# Patient Record
Sex: FEMALE | Race: WHITE | Employment: STUDENT | URBAN - METROPOLITAN AREA
[De-identification: names, ages, dates, MRNs, and addresses within clinical notes are randomized per-mention and may not be internally consistent; named-entity substitution may affect disease eponyms.]

---

## 2017-01-26 ENCOUNTER — MYC MEDICAL ADVICE (OUTPATIENT)
Dept: ORTHOPEDICS | Facility: CLINIC | Age: 22
End: 2017-01-26

## 2017-02-20 ENCOUNTER — OFFICE VISIT (OUTPATIENT)
Dept: ORTHOPEDICS | Facility: CLINIC | Age: 22
End: 2017-02-20
Payer: COMMERCIAL

## 2017-02-20 VITALS — SYSTOLIC BLOOD PRESSURE: 122 MMHG | DIASTOLIC BLOOD PRESSURE: 70 MMHG | HEART RATE: 67 BPM | OXYGEN SATURATION: 97 %

## 2017-02-20 DIAGNOSIS — M25.312 INSTABILITY OF LEFT SHOULDER JOINT: Primary | ICD-10-CM

## 2017-02-20 PROCEDURE — 99213 OFFICE O/P EST LOW 20 MIN: CPT | Performed by: FAMILY MEDICINE

## 2017-02-20 ASSESSMENT — PAIN SCALES - GENERAL: PAINLEVEL: SEVERE PAIN (7)

## 2017-02-20 NOTE — NURSING NOTE
"Jenifer Berry's goals for this visit include: Follow up with left shoulder pain.   She requests these members of her care team be copied on today's visit information: yes    PCP: Pooja Sheridan    Referring Provider:  Luz Maria Arboleda MD  [place] [id number]  [place] [address]    Chief Complaint   Patient presents with     RECHECK     Shoulder left     Pain is the same.        Initial /70 (BP Location: Right arm, Patient Position: Chair, Cuff Size: Adult Regular)  Pulse 67  SpO2 97% Estimated body mass index is 25.11 kg/(m^2) as calculated from the following:    Height as of 11/23/15: 1.6 m (5' 3\").    Weight as of 11/23/15: 64.3 kg (141 lb 12.1 oz).  Medication Reconciliation: complete    "

## 2017-02-20 NOTE — MR AVS SNAPSHOT
After Visit Summary   2017    Jenifer Berry    MRN: [id number]           Patient Information     Date Of Birth          1995        Visit Information        Provider Department      2017 10:40 AM Vito Calderon, DO [facility]        Today's Diagnoses     Instability of left shoulder joint    -  1      Care Instructions    Thanks for coming today.  Ortho[facility] Medicine Clinic  [address] [place] [address]    To schedule future appointments in Ortho Clinic, you may call [phone].    To schedule ordered imaging by your provider:   Call Central Imaging [phone]    To schedule an injection ordered by your provider:  Call Central Imaging Injection scheduling line: [phone]  Blizuu available online at:  [email or web address]    Please call if any further questions or concerns ([phone]).  Clinic hours 8 am to 5 pm.    Return to clinic (call) if symptoms worsen or fail to improve.          Follow-ups after your visit        Who to contact     If you have questions or need follow up information about today's clinic visit or your schedule please contact [facility] directly at [phone].  Normal or non-critical lab and imaging results will be communicated to you by wriplhart, letter or phone within 4 business days after the clinic has received the results. If you do not hear from us within 7 days, please contact the clinic through wriplhart or phone. If you have a critical or abnormal lab result, we will notify you by phone as soon as possible.  Submit refill requests through Blizuu or call your pharmacy and they will forward the refill request to us. Please allow 3 business days for your refill to be completed.          Additional Information About Your Visit        MyChart Information     Blizuu gives you secure access to your electronic health record. If you see a primary care provider, you can also send  messages to your care team and make appointments. If you have questions, please call your primary care clinic.  If you do not have a primary care provider, please call [phone] and they will assist you.      LawPath is an electronic gateway that provides easy, online access to your medical records. With LawPath, you can request a clinic appointment, read your test results, renew a prescription or communicate with your care team.     To access your existing account, please contact your [facility] or call [phone] for assistance.        Care EveryWhere ID     This is your Care EveryWhere ID. This could be used by other organizations to access your [facility] medical records  [id number]        Your Vitals Were     Pulse Pulse Oximetry                67 97%           Blood Pressure from Last 3 Encounters:   02/20/17 122/70   10/10/16 121/80   11/23/15 115/71    Weight from Last 3 Encounters:   11/23/15 64.3 kg (141 lb 12.1 oz)   04/09/15 63.9 kg (140 lb 14 oz) (71 %)*   12/17/14 64.4 kg (142 lb) (74 %)*     * Growth percentiles are based on [facility] 2-20 Years data.              Today, you had the following     No orders found for display       Primary Care Provider Office Phone # Fax #    Pooja Sheridan [phone]       [facility] [id number] [place] [address]        Thank you!     Thank you for choosing [facility]  for your care. Our goal is always to provide you with excellent care. Hearing back from our patients is one way we can continue to improve our services. Please take a few minutes to complete the written survey that you may receive in the mail after your visit with us. Thank you!             Your Updated Medication List - Protect others around you: Learn how to safely use, store and throw away your medicines at www.disposemymeds.org.          This list is accurate as of: 2/20/17 11:59 PM.  Always use your  most recent med list.                   Brand Name Dispense Instructions for use    APRI 0.15-30 MG-MCG per tablet   Generic drug:  desogestrel-ethinyl estradiol      Take 1 tablet by mouth       * NAPROXEN PO      Take by mouth as needed       * naproxen 500 MG tablet    NAPROSYN     Take 500 mg by mouth       * Notice:  This list has 2 medication(s) that are the same as other medications prescribed for you. Read the directions carefully, and ask your doctor or other care provider to review them with you.

## 2017-02-20 NOTE — PROGRESS NOTES
"HISTORY OF PRESENT ILLNESS  Ms. Berry is a pleasant 21 year old year old female who presents to clinic today for follow up of her left shoulder pain. 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 Arboleda for left shoulder instability.    Jenifer was at the gym a couple of weeks ago and felt her left shoulder sublux while doing core work.  She's been having left handed tingling since this point.  \"My arm feels uncomfortable.\"  Worse with certain movements.  Stabbing.  Worst is her lateral shoulder, feels some numbness over the deltoid that is improving.  Additional history: as documented      REVIEW OF SYSTEMS (2/20/2017)  10 point ROS of systems including Constitutional, Eyes, Respiratory, Cardiovascular, Gastroenterology, Genitourinary, Integumentary, Musculoskeletal, Psychiatric were all negative except for pertinent positives noted in my HPI.     PHYSICAL EXAM  Vitals:    02/20/17 1039   BP: 122/70   BP Location: Right arm   Patient Position: Chair   Cuff Size: Adult Regular   Pulse: 67   SpO2: 97%     General  - normal appearance, in no obvious distress  CV  - normal radial pulse  Pulm  - normal respiratory pattern, non-labored  Musculoskeletal - left shoulder  - inspection: normal bone and joint alignment, no obvious deformity, no scapular winging, no AC step-off  - palpation: mild general tenderness anteriorly and superiorly  - ROM: normal flexion, IR, ER, abduction, painful at end range  - strength: 5/5  strength, 5/5 in all shoulder planes  - special tests:  (-) Speed's  (-) Neer  (-) Hawkin's  (-) Yohannes's  (+) Forbes's  (+) load & shift, supine  (+) apprehension  (-) subscap lift-off  Neuro  - no sensory or motor deficit, grossly normal coordination, normal muscle tone  Skin  - no ecchymosis, erythema, warmth, or induration, no obvious rash  Psych  - interactive, appropriate, normal mood and affect              ASSESSMENT & PLAN  Ms. Berry is a 21 year old year old female who is in " the office today with left shoulder pain, numbness, and instability.    Jenifer's symptoms are likely secondary to nerve dysfunction after a subluxation episode.  Ultimately I'm happy that she's improving.  I reassured her that her symptoms will likely improve.  She understands Hemal-Danlos well and knows that her underlying disorder is likely causing these episodes, although she's understandably frustrated to have a recurrence after stabilization surgery.    Jenifer should slowly restart her strengthening routine and advance as tolerated.    It was a pleasure taking care of Jenifer.        Vito Calderon, , CAQSM

## 2017-02-20 NOTE — PATIENT INSTRUCTIONS
Thanks for coming today.  Ortho[facility] Medicine Clinic  [address] [place] [address]    To schedule future appointments in Ortho Clinic, you may call [phone].    To schedule ordered imaging by your provider:   Call Central Imaging [phone]    To schedule an injection ordered by your provider:  Call Central Imaging Injection scheduling line: [phone]  [employer] Spinehart available online at:  [email or web address]    Please call if any further questions or concerns ([phone]).  Clinic hours 8 am to 5 pm.    Return to clinic (call) if symptoms worsen or fail to improve.

## 2017-03-15 ENCOUNTER — MYC MEDICAL ADVICE (OUTPATIENT)
Dept: ORTHOPEDICS | Facility: CLINIC | Age: 22
End: 2017-03-15

## 2017-03-30 ENCOUNTER — RADIANT APPOINTMENT (OUTPATIENT)
Dept: GENERAL RADIOLOGY | Facility: CLINIC | Age: 22
End: 2017-03-30
Attending: FAMILY MEDICINE
Payer: COMMERCIAL

## 2017-03-30 ENCOUNTER — OFFICE VISIT (OUTPATIENT)
Dept: ORTHOPEDICS | Facility: CLINIC | Age: 22
End: 2017-03-30
Payer: COMMERCIAL

## 2017-03-30 VITALS — HEART RATE: 82 BPM | SYSTOLIC BLOOD PRESSURE: 118 MMHG | OXYGEN SATURATION: 98 % | DIASTOLIC BLOOD PRESSURE: 70 MMHG

## 2017-03-30 DIAGNOSIS — M54.12 CERVICAL RADICULOPATHY: ICD-10-CM

## 2017-03-30 DIAGNOSIS — M54.2 ACUTE NECK PAIN: ICD-10-CM

## 2017-03-30 DIAGNOSIS — M54.2 ACUTE NECK PAIN: Primary | ICD-10-CM

## 2017-03-30 PROCEDURE — 72040 X-RAY EXAM NECK SPINE 2-3 VW: CPT | Performed by: RADIOLOGY

## 2017-03-30 PROCEDURE — 99213 OFFICE O/P EST LOW 20 MIN: CPT | Performed by: FAMILY MEDICINE

## 2017-03-30 RX ORDER — PREDNISONE 20 MG/1
40 TABLET ORAL DAILY
Qty: 10 TABLET | Refills: 0 | Status: SHIPPED | OUTPATIENT
Start: 2017-03-30 | End: 2017-04-04

## 2017-03-30 ASSESSMENT — PAIN SCALES - GENERAL: PAINLEVEL: SEVERE PAIN (7)

## 2017-03-30 NOTE — NURSING NOTE
"Jenifer Berry's goals for this visit include: Follow up with neck pain.   She requests these members of her care team be copied on today's visit information: yes    PCP: Pooja Sheridan    Referring Provider:  Luz Maria Arboleda MD  [place] [id number] [place] [id number]  [place] [address]    Chief Complaint   Patient presents with     RECHECK     Neck Pain     Jenifer said the pain is getting worse.        Initial /70 (BP Location: Left arm, Patient Position: Chair, Cuff Size: Adult Regular)  Pulse 82  SpO2 98% Estimated body mass index is 25.11 kg/(m^2) as calculated from the following:    Height as of 11/23/15: 1.6 m (5' 3\").    Weight as of 11/23/15: 64.3 kg (141 lb 12.1 oz).  Medication Reconciliation: complete    "

## 2017-03-30 NOTE — PROGRESS NOTES
HISTORY OF PRESENT ILLNESS  Ms. Berry is a pleasant 21 year old year old female who presents to clinic today for follow up of her shoulder pain.  Jenifer called in recently reporting new symptoms of left arm numbness, tingling, and weakness that travelled down to her hand.  Jenifer explains that her left 4th and 5th fingers have continued to go numb and tingly over the course of the past few weeks.  Worse with neck movement.  Will get weak at times.  Additional history: as documented      REVIEW OF SYSTEMS (3/30/2017)  10 point ROS of systems including Constitutional, Eyes, Respiratory, Cardiovascular, Gastroenterology, Genitourinary, Integumentary, Musculoskeletal, Psychiatric were all negative except for pertinent positives noted in my HPI.     PHYSICAL EXAM  Vitals:    03/30/17 1509   BP: 118/70   BP Location: Left arm   Patient Position: Chair   Cuff Size: Adult Regular   Pulse: 82   SpO2: 98%     General  - normal appearance, in no obvious distress  CV  - normal peripheral perfusion  Pulm  - normal respiratory pattern, non-labored  Musculoskeletal - cervical spine  - inspection: normal bone and joint alignment, no obvious kyphosis  - palpation: no paravertebral or bony tenderness  - ROM: pain with right rotation and sidebending  - strength: upper extremities 5/5 in all planes  Neuro  - C5-7 DTRs 2+ bilaterally, no sensory or motor deficit, grossly normal coordination, normal muscle tone  Skin  - no ecchymosis, erythema, warmth, or induration, no obvious rash  Psych  - interactive, appropriate, normal mood and affect          ASSESSMENT & PLAN  Ms. Berry is a 21 year old year old female who is in the office today with left arm numbness and tingling.    I ordered & reviewed an xray of her cervical spine which shows disc space narrowing at the inferior cervical levels.  This fits with her symptoms of C8 radiculopathy.    Jenifer and I had a good discussion regarding non-operative treatment for degenerative disc  disease.  This included strengthening of the paraspinal and core muscles, weight control, and oral analgesics when needed.  We also discussed the role of epidural corticosteroid injections.    I prescribed her a short course of prednisone.  I also demonstrated some simple cervical muscle exercises to perform daily.    If Jenifer doesn't improve or worsens in the next 4-6 weeks we could perform advanced imaging.    It was a pleasure taking care of Jenifer.        Vito Calderon DO, CAQSM

## 2017-03-30 NOTE — PATIENT INSTRUCTIONS
Thanks for coming today.  Ortho/Sports Medicine Clinic  [address] [place] [address]    To schedule future appointments in Ortho Clinic, you may call [phone].    To schedule ordered imaging by your provider:   Call Central Imaging [phone]    To schedule an injection ordered by your provider:  Call Central Imaging Injection scheduling line: [phone]  YAZUOhart available online at:  [email or web address]    Please call if any further questions or concerns ([phone]).  Clinic hours 8 am to 5 pm.    Return to clinic (call) if symptoms worsen or fail to improve.

## 2017-06-13 ENCOUNTER — MYC MEDICAL ADVICE (OUTPATIENT)
Dept: ORTHOPEDICS | Facility: CLINIC | Age: 22
End: 2017-06-13

## 2017-06-13 DIAGNOSIS — M54.2 ACUTE NECK PAIN: ICD-10-CM

## 2017-06-13 DIAGNOSIS — M54.12 CERVICAL RADICULOPATHY: Primary | ICD-10-CM

## 2017-07-07 ENCOUNTER — RADIANT APPOINTMENT (OUTPATIENT)
Dept: MRI IMAGING | Facility: CLINIC | Age: 22
End: 2017-07-07
Attending: FAMILY MEDICINE
Payer: COMMERCIAL

## 2017-07-07 DIAGNOSIS — M54.2 ACUTE NECK PAIN: ICD-10-CM

## 2017-07-07 DIAGNOSIS — M54.12 CERVICAL RADICULOPATHY: ICD-10-CM

## 2017-07-07 PROCEDURE — 73221 MRI JOINT UPR EXTREM W/O DYE: CPT | Mod: LT | Performed by: RADIOLOGY

## 2017-07-07 PROCEDURE — 72141 MRI NECK SPINE W/O DYE: CPT | Performed by: RADIOLOGY

## 2017-07-24 ENCOUNTER — MYC MEDICAL ADVICE (OUTPATIENT)
Dept: ORTHOPEDICS | Facility: CLINIC | Age: 22
End: 2017-07-24

## 2017-07-27 ENCOUNTER — MYC MEDICAL ADVICE (OUTPATIENT)
Dept: ORTHOPEDICS | Facility: CLINIC | Age: 22
End: 2017-07-27

## 2017-08-16 ENCOUNTER — TELEPHONE (OUTPATIENT)
Dept: ORTHOPEDICS | Facility: CLINIC | Age: 22
End: 2017-08-16

## 2017-08-16 ENCOUNTER — THERAPY VISIT (OUTPATIENT)
Dept: PHYSICAL THERAPY | Facility: CLINIC | Age: 22
End: 2017-08-16
Payer: COMMERCIAL

## 2017-08-16 DIAGNOSIS — M25.512 LEFT SHOULDER PAIN, UNSPECIFIED CHRONICITY: Primary | ICD-10-CM

## 2017-08-16 DIAGNOSIS — M54.12 CERVICAL RADICULOPATHY: Primary | ICD-10-CM

## 2017-08-16 DIAGNOSIS — Z47.89 AFTERCARE FOLLOWING SURGERY OF THE MUSCULOSKELETAL SYSTEM: ICD-10-CM

## 2017-08-16 DIAGNOSIS — M25.312 INSTABILITY OF LEFT SHOULDER JOINT: Primary | ICD-10-CM

## 2017-08-16 DIAGNOSIS — M54.2 CERVICALGIA: ICD-10-CM

## 2017-08-16 PROCEDURE — 97112 NEUROMUSCULAR REEDUCATION: CPT | Mod: GP | Performed by: PHYSICAL THERAPIST

## 2017-08-16 PROCEDURE — 97161 PT EVAL LOW COMPLEX 20 MIN: CPT | Mod: GP | Performed by: PHYSICAL THERAPIST

## 2017-08-16 PROCEDURE — 97110 THERAPEUTIC EXERCISES: CPT | Mod: GP | Performed by: PHYSICAL THERAPIST

## 2017-08-16 NOTE — LETTER
[facility]  [address]  [place] [address]  [phone]    2017    Re: Jenifer Berry   :   1995  MRN:  [id number]   REFERRING PHYSICIAN:   [facility]    Date of Initial Evaluation:  17  Visits:  Rxs Used: 1  Reason for Referral:     Aftercare following surgery of the musculoskeletal system  Left shoulder pain, unspecified chronicity    [facility] Initial Evaluation    Subjective:  Patient is a 21 year old female presenting with rehab left shoulder hpi.   Jenifer Berry is a 21 year old female with a left shoulder condition.  Condition occurred with:  Repetition/overuse.  Condition occurred: during recreation/sport.  This is a chronic condition  17 patient received MD orders for left shoulder pain and instability that started about 6 months ago when she was working out.    Patient reports pain:  Anterior, posterior and in the joint.  Radiates to: some numbness in 4th, 5th digits when elbow flexed for long periods.  Pain is described as sharp and aching and is intermittent   Associated symptoms:  Dislocating/subluxing and loss of strength. Pain is worse during the day.  Symptoms are exacerbated by using arm overhead, certain positions, lifting and using arm at shoulder level and relieved by rest.  Since onset symptoms are unchanged.  Special tests:  X-ray and MRI.  Previous treatment includes physical therapy and surgery.  There was significant improvement following previous treatment.  General health as reported by patient is good.  Past medical history: Hemal-Danlos.  Medical allergies: no.  Other surgeries include:  Orthopedic surgery (L shoulder x2).  Current medications:  Pain medication.  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  Primary job tasks include:  Prolonged standing, other and lifting (computer work).  Barriers include:  None as  reported by the patient.  Red flags:  None as reported by the patient.    Objective:  SHOULDER:   AROM L AROM R MMT L MMT R   Flex 175 180 5-/5 5/5   Abd 175 180 5-/5, mild pain 5/5   Full Can   5-/5 5/5   IR   5/5 5/5   ER 85 90 5-/5 5/5   Ext/IR T5 T5       Scapulothoraic Rhythm: left scapular dyskinesis noted with repeated overhead flexion (prominent medial border/winging)    Palpation: tender over left proximal biceps tendon    Special tests:   L R   Impingement     Neer's - -   Hawkin's-Helder - -   Coracoid Impingement - -   Internal impingement + -   Labral     Anterior Slide - -   Stark's - -   Crank - -   Instability     Apprehension (anterior) +/- -   Relocation (anterior) +/- -   Anterior Load & Shift - -   Posterior Load & Shift - -   Posterior instability (with 90 degrees flex) - -   Multi-Directional Instability      Sulcus + -   Biceps      Speed's + -   Rotator Cuff Tear     Drop Arm - -   Belly Press - -   Lift off  - -     GH Mobility  L  R   Posterior glide hyper hyper   Inferior glide hyper hyper   Anterior glide hypo hyper     Assessment/Plan:    Patient is a 21 year old female with left side shoulder complaints.    Patient has the following significant findings with corresponding treatment plan.                Diagnosis 1:  Left shoulder pain    Pain -  hot/cold therapy, splint/taping/bracing/orthotics, self management, education and home program  Decreased strength - therapeutic exercise, therapeutic activities and home program  Decreased proprioception - neuro re-education, therapeutic activities and home program  Decreased function - therapeutic activities and home program  Instability -  Therapeutic Activity  Therapeutic Exercise  Neuromuscular Re-education  Splinting/Taping/Bracing/Orthotic    Therapy Evaluation Codes:   1) History comprised of:   Personal factors that impact the plan of care:      Past/current experiences.    Comorbidity factors that impact the plan of care are:       Hemal-Danlos syndrome.     Medications impacting care: Pain.  2) Examination of Body Systems comprised of:   Body structures and functions that impact the plan of care:      Shoulder.   Activity limitations that impact the plan of care are:      Cooking, Dressing, Lifting and Working.  3) Clinical presentation characteristics are:   Stable/Uncomplicated.  4) Decision-Making    Low complexity using standardized patient assessment instrument and/or measureable assessment of functional outcome.    Cumulative Therapy Evaluation is: Low complexity.  Previous and current functional limitations:  (See Goal Flow Sheet for this information)    Short term and Long term goals: (See Goal Flow Sheet for this information)   Communication ability:  Patient appears to be able to clearly communicate and understand verbal and written communication and follow directions correctly.  Treatment Explanation - The following has been discussed with the patient:   RX ordered/plan of care  Anticipated outcomes  Possible risks and side effects  This patient would benefit from PT intervention to resume normal activities.   Rehab potential is good.    Frequency:  1 X week, once daily  Duration:  for 4 weeks tapering to 2 X a month over 8 weeks  Discharge Plan:  Achieve all LTG.  Independent in home treatment program.  Reach maximal therapeutic benefit.    Thank you for your referral.    INQUIRIES  Therapist: Brenton Landis DPT, SCS  [facility]  [address] [place] [address]  Phone: [phone]  Fax: [phone]

## 2017-08-16 NOTE — PROGRESS NOTES
[facility] Initial Evaluation    Subjective:    Patient is a 21 year old female presenting with rehab left shoulder hpi.   Jenifer Berry is a 21 year old female with a left shoulder condition.  Condition occurred with:  Repetition/overuse.  Condition occurred: during recreation/sport.  This is a chronic condition  8/16/17 patient received MD orders for left shoulder pain and instability that started about 6 months ago when she was working out.    Patient reports pain:  Anterior, posterior and in the joint.  Radiates to: some numbness in 4th, 5th digits when elbow flexed for long periods.  Pain is described as sharp and aching and is intermittent   Associated symptoms:  Dislocating/subluxing and loss of strength. Pain is worse during the day.  Symptoms are exacerbated by using arm overhead, certain positions, lifting and using arm at shoulder level and relieved by rest.  Since onset symptoms are unchanged.  Special tests:  X-ray and MRI.  Previous treatment includes physical therapy and surgery.  There was significant improvement following previous treatment.  General health as reported by patient is good.  Past medical history: Hemal-Danlos.  Medical allergies: no.  Other surgeries include:  Orthopedic surgery (L shoulder x2).  Current medications:  Pain medication.  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  Primary job tasks include:  Prolonged standing, other and lifting (computer work).    Barriers include:  None as reported by the patient.    Red flags:  None as reported by the patient.                        Objective:  SHOULDER:   AROM L AROM R MMT L MMT R   Flex 175 180 5-/5 5/5   Abd 175 180 5-/5, mild pain 5/5   Full Can   5-/5 5/5   IR   5/5 5/5   ER 85 90 5-/5 5/5   Ext/IR T5 T5       Scapulothoraic Rhythm: left scapular dyskinesis noted with repeated overhead flexion (prominent medial border/winging)    Palpation:  tender over left proximal biceps tendon    Special tests:   L R   Impingement     Neer's - -   Hawkin's-Helder - -   Coracoid Impingement - -   Internal impingement + -   Labral     Anterior Slide - -   Metcalfe's - -   Crank - -   Instability     Apprehension (anterior) +/- -   Relocation (anterior) +/- -   Anterior Load & Shift - -   Posterior Load & Shift - -   Posterior instability (with 90 degrees flex) - -   Multi-Directional Instability      Sulcus + -   Biceps      Speed's + -   Rotator Cuff Tear     Drop Arm - -   Belly Press - -   Lift off  - -     GH Mobility  L  R   Posterior glide hyper hyper   Inferior glide hyper hyper   Anterior glide hypo hyper           System    Physical Exam    General     ROS    Assessment/Plan:      Patient is a 21 year old female with left side shoulder complaints.    Patient has the following significant findings with corresponding treatment plan.                Diagnosis 1:  Left shoulder pain    Pain -  hot/cold therapy, splint/taping/bracing/orthotics, self management, education and home program  Decreased strength - therapeutic exercise, therapeutic activities and home program  Decreased proprioception - neuro re-education, therapeutic activities and home program  Decreased function - therapeutic activities and home program  Instability -  Therapeutic Activity  Therapeutic Exercise  Neuromuscular Re-education  Splinting/Taping/Bracing/Orthotic    Therapy Evaluation Codes:   1) History comprised of:   Personal factors that impact the plan of care:      Past/current experiences.    Comorbidity factors that impact the plan of care are:      Hemal-Danlos syndrome.     Medications impacting care: Pain.  2) Examination of Body Systems comprised of:   Body structures and functions that impact the plan of care:      Shoulder.   Activity limitations that impact the plan of care are:      Cooking, Dressing, Lifting and Working.  3) Clinical presentation characteristics  are:   Stable/Uncomplicated.  4) Decision-Making    Low complexity using standardized patient assessment instrument and/or measureable assessment of functional outcome.  Cumulative Therapy Evaluation is: Low complexity.    Previous and current functional limitations:  (See Goal Flow Sheet for this information)    Short term and Long term goals: (See Goal Flow Sheet for this information)     Communication ability:  Patient appears to be able to clearly communicate and understand verbal and written communication and follow directions correctly.  Treatment Explanation - The following has been discussed with the patient:   RX ordered/plan of care  Anticipated outcomes  Possible risks and side effects  This patient would benefit from PT intervention to resume normal activities.   Rehab potential is good.    Frequency:  1 X week, once daily  Duration:  for 4 weeks tapering to 2 X a month over 8 weeks  Discharge Plan:  Achieve all LTG.  Independent in home treatment program.  Reach maximal therapeutic benefit.    Please refer to the daily flowsheet for treatment today, total treatment time and time spent performing 1:1 timed codes.

## 2017-08-16 NOTE — TELEPHONE ENCOUNTER
----- Message from Brenton Landis, PT sent at 8/16/2017 10:55 AM CDT -----  Regarding: Orders  Hi Dr. Calderon and Allie,    Could you please place orders for PT in Epic for Jenifer Berry?  She is scheduled to see me today and I don't see any orders for her shoulder in her chart.  Thanks!    Isaiah Lnadis, DPT, SCS  [facility] [place] [facility]

## 2017-08-25 ENCOUNTER — THERAPY VISIT (OUTPATIENT)
Dept: PHYSICAL THERAPY | Facility: CLINIC | Age: 22
End: 2017-08-25
Payer: COMMERCIAL

## 2017-08-25 DIAGNOSIS — M25.512 LEFT SHOULDER PAIN, UNSPECIFIED CHRONICITY: ICD-10-CM

## 2017-08-25 PROCEDURE — 97110 THERAPEUTIC EXERCISES: CPT | Mod: GP | Performed by: PHYSICAL THERAPIST

## 2017-08-25 PROCEDURE — 97112 NEUROMUSCULAR REEDUCATION: CPT | Mod: GP | Performed by: PHYSICAL THERAPIST

## 2017-08-30 ENCOUNTER — THERAPY VISIT (OUTPATIENT)
Dept: PHYSICAL THERAPY | Facility: CLINIC | Age: 22
End: 2017-08-30
Payer: COMMERCIAL

## 2017-08-30 DIAGNOSIS — M25.512 LEFT SHOULDER PAIN, UNSPECIFIED CHRONICITY: ICD-10-CM

## 2017-08-30 PROCEDURE — 97110 THERAPEUTIC EXERCISES: CPT | Mod: GP | Performed by: PHYSICAL THERAPIST

## 2017-08-30 PROCEDURE — 97112 NEUROMUSCULAR REEDUCATION: CPT | Mod: GP | Performed by: PHYSICAL THERAPIST

## 2017-09-06 ENCOUNTER — THERAPY VISIT (OUTPATIENT)
Dept: PHYSICAL THERAPY | Facility: CLINIC | Age: 22
End: 2017-09-06
Payer: COMMERCIAL

## 2017-09-06 DIAGNOSIS — M25.512 LEFT SHOULDER PAIN, UNSPECIFIED CHRONICITY: ICD-10-CM

## 2017-09-06 PROCEDURE — 97110 THERAPEUTIC EXERCISES: CPT | Mod: GP | Performed by: PHYSICAL THERAPIST

## 2017-09-06 PROCEDURE — 97112 NEUROMUSCULAR REEDUCATION: CPT | Mod: GP | Performed by: PHYSICAL THERAPIST

## 2017-09-13 ENCOUNTER — THERAPY VISIT (OUTPATIENT)
Dept: PHYSICAL THERAPY | Facility: CLINIC | Age: 22
End: 2017-09-13
Payer: COMMERCIAL

## 2017-09-13 DIAGNOSIS — M25.512 LEFT SHOULDER PAIN, UNSPECIFIED CHRONICITY: ICD-10-CM

## 2017-09-13 PROCEDURE — 97110 THERAPEUTIC EXERCISES: CPT | Mod: GP | Performed by: PHYSICAL THERAPIST

## 2017-09-13 PROCEDURE — 97112 NEUROMUSCULAR REEDUCATION: CPT | Mod: GP | Performed by: PHYSICAL THERAPIST

## 2017-09-20 ENCOUNTER — THERAPY VISIT (OUTPATIENT)
Dept: PHYSICAL THERAPY | Facility: CLINIC | Age: 22
End: 2017-09-20
Payer: COMMERCIAL

## 2017-09-20 DIAGNOSIS — M25.512 LEFT SHOULDER PAIN, UNSPECIFIED CHRONICITY: ICD-10-CM

## 2017-09-20 PROCEDURE — 97110 THERAPEUTIC EXERCISES: CPT | Mod: GP | Performed by: PHYSICAL THERAPIST

## 2017-09-20 PROCEDURE — 97112 NEUROMUSCULAR REEDUCATION: CPT | Mod: GP | Performed by: PHYSICAL THERAPIST

## 2017-10-04 ENCOUNTER — THERAPY VISIT (OUTPATIENT)
Dept: PHYSICAL THERAPY | Facility: CLINIC | Age: 22
End: 2017-10-04
Payer: COMMERCIAL

## 2017-10-04 DIAGNOSIS — M25.512 LEFT SHOULDER PAIN, UNSPECIFIED CHRONICITY: ICD-10-CM

## 2017-10-04 PROCEDURE — 97110 THERAPEUTIC EXERCISES: CPT | Mod: GP | Performed by: PHYSICAL THERAPIST

## 2017-10-04 PROCEDURE — 97112 NEUROMUSCULAR REEDUCATION: CPT | Mod: GP | Performed by: PHYSICAL THERAPIST

## 2017-10-18 ENCOUNTER — THERAPY VISIT (OUTPATIENT)
Dept: PHYSICAL THERAPY | Facility: CLINIC | Age: 22
End: 2017-10-18
Payer: COMMERCIAL

## 2017-10-18 DIAGNOSIS — M25.512 LEFT SHOULDER PAIN, UNSPECIFIED CHRONICITY: ICD-10-CM

## 2017-10-18 PROCEDURE — 97110 THERAPEUTIC EXERCISES: CPT | Mod: GP | Performed by: PHYSICAL THERAPIST

## 2017-10-18 PROCEDURE — 97112 NEUROMUSCULAR REEDUCATION: CPT | Mod: GP | Performed by: PHYSICAL THERAPIST

## 2017-10-18 NOTE — PROGRESS NOTES
Subjective:    HPI                    Objective:    System    Physical Exam    General     ROS    Assessment/Plan:      PROGRESS  REPORT    Progress reporting period is from 8/16/17 to 10/18/17.       SUBJECTIVE  Subjective changes noted by patient:  Jenifer reports her shoulder is doing well overall.  She still has days now and then where her shoulder will ache, but they are not frequent and they are isolated.  She has resumed most of her normal workout routine with decreased weight for some lifts.  She also did some swimming without problems.  She will continue her HEP 4 days/week until the new year and then start a maintenance program 2 days/week.  She will contact PT if she has any setbacks or problems.    Current pain level is 0/10.     Previous pain level was 6/10.   Changes in function:  Yes, see above.  Adverse reaction to treatment or activity: None    OBJECTIVE  Changes noted in objective findings:  Yes  Left shoulder AROM full in all planes without pain.    Left shoulder strength 5/5 in all planes without pain.    Bilateral scapulothoracic rhythm is good with repeated arm elevation.      ASSESSMENT/PLAN  Updated problem list and treatment plan: Diagnosis 1:  Left shoulder pain    Decreased strength - home program  Decreased proprioception - home program  STG/LTGs have been met or progress has been made towards goals:  Yes, able to lift 5 lb with left arm without pain, normal ADL's.  Assessment of Progress: The patient's condition has potential to improve.  The patient has met all of their long term goals.  Self Management Plans:  Patient has been instructed in a home treatment program.  Patient  has been instructed in self management of symptoms.    Jenifer continues to require the following intervention to meet STG and LTG's:  HEP    Recommendations:  Jenifer will continue her HEP and contact PT if she has any setbacks or issues over the next 8-10 weeks. If she has no problems during that time she will be  discharged from PT.    Please refer to the daily flowsheet for treatment today, total treatment time and time spent performing 1:1 timed codes.

## 2017-11-12 ENCOUNTER — HEALTH MAINTENANCE LETTER (OUTPATIENT)
Age: 22
End: 2017-11-12

## 2018-04-12 NOTE — PROGRESS NOTES
Patient is discharged from PT.  Please refer to progress/discharge note dated 10/18/17 for last known functional status as well as final objective/subjective values.

## 2018-10-23 ENCOUNTER — RADIANT APPOINTMENT (OUTPATIENT)
Dept: GENERAL RADIOLOGY | Facility: CLINIC | Age: 23
End: 2018-10-23
Attending: FAMILY MEDICINE
Payer: COMMERCIAL

## 2018-10-23 ENCOUNTER — OFFICE VISIT (OUTPATIENT)
Dept: ORTHOPEDICS | Facility: CLINIC | Age: 23
End: 2018-10-23
Payer: COMMERCIAL

## 2018-10-23 VITALS
OXYGEN SATURATION: 100 % | HEART RATE: 65 BPM | SYSTOLIC BLOOD PRESSURE: 133 MMHG | WEIGHT: 157.9 LBS | DIASTOLIC BLOOD PRESSURE: 85 MMHG | BODY MASS INDEX: 26.96 KG/M2 | HEIGHT: 64 IN

## 2018-10-23 DIAGNOSIS — M54.16 LUMBAR RADICULAR PAIN: Primary | ICD-10-CM

## 2018-10-23 DIAGNOSIS — M54.16 LUMBAR RADICULAR PAIN: ICD-10-CM

## 2018-10-23 PROCEDURE — 99214 OFFICE O/P EST MOD 30 MIN: CPT | Performed by: FAMILY MEDICINE

## 2018-10-23 PROCEDURE — 72100 X-RAY EXAM L-S SPINE 2/3 VWS: CPT | Performed by: RADIOLOGY

## 2018-10-23 RX ORDER — NAPROXEN 500 MG/1
500 TABLET ORAL 2 TIMES DAILY PRN
Qty: 30 TABLET | Refills: 1 | Status: SHIPPED | OUTPATIENT
Start: 2018-10-23

## 2018-10-23 RX ORDER — CYCLOBENZAPRINE HCL 10 MG
5 TABLET ORAL
Qty: 30 TABLET | Refills: 0 | Status: SHIPPED | OUTPATIENT
Start: 2018-10-23

## 2018-10-23 ASSESSMENT — PAIN SCALES - GENERAL: PAINLEVEL: SEVERE PAIN (6)

## 2018-10-23 NOTE — PROGRESS NOTES
"HISTORY OF PRESENT ILLNESS  Ms. Berry is a pleasant 22 year old year old female here with low back pain.  I last saw her in March of last year, she was having numbness and tingling suggestive of a radicular etiology in the inferior cervical spine.  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  Jenifer's low back started to bother her a couple of months ago.  No clear event that she can recall,  Although she has been exercising more over the past couple of months.  Pain is constant, stabbing, worse with bending, squatting, and turning.  She has tried Advil and ice in addition to chiropractic treatment, these helped somewhat but her pain is persistent.  Additional history: as documented      REVIEW OF SYSTEMS (10/23/2018)  10 point ROS of systems including Constitutional, Eyes, Respiratory, Cardiovascular, Gastroenterology, Genitourinary, Integumentary, Musculoskeletal, Psychiatric were all negative except for pertinent positives noted in my HPI.     PHYSICAL EXAM  Vitals:    10/23/18 0748   BP: 133/85   Pulse: 65   SpO2: 100%   Weight: 71.6 kg (157 lb 14.4 oz)   Height: 1.63 m (5' 4.17\")     General  - normal appearance, in no obvious distress  CV  - normal peripheral perfusion  Pulm  - normal respiratory pattern, non-labored  Musculoskeletal - lumbar spine  - stance: normal gait without limp  - inspection: normal bone and joint alignment, no obvious scoliosis  - palpation: tender throughout lumbar paraspinals R > L  - ROM: flexion relieves pain  - strength: lower extremities 5/5 in all planes  - special tests:  (+) straight leg raise on R  (+) slump test on R  Neuro  - patellar and Achilles DTRs 2+ bilaterally, grossly normal coordination, normal muscle tone  Skin  - no ecchymosis, erythema, warmth, or induration, no obvious rash  Psych  - interactive, appropriate, normal mood and affect          ASSESSMENT & PLAN  Ms. Berry is a 22 year old year old female here with low back pain.    I ordered & reviewed an " xray of her low back which does show some mild narrowing of the L5-S1 junction.  This may account for her symptoms.  We discussed this in detail.    We revisited our discussion centering around treatment for radicular symptoms, she's had issues with her cervical spine in the past.    I do think she'll do great with conservative care and attention to exacerbating activities.  I gave her a home exercise plan and prescribed her naproxen and flexeril.    If her symptoms worsen over the next month despite these measures I'd order an MR.    It was a pleasure seeing Jenifer.        Vito Calderon, , CAM

## 2018-10-23 NOTE — LETTER
"    10/23/2018         RE: Jenifer Berry  [address] [facility] [address]        Dear Colleague,    Thank you for referring your patient, Jenifer Berry, to the [facility]. Please see a copy of my visit note below.    HISTORY OF PRESENT ILLNESS  Ms. Berry is a pleasant 22 year old year old female here with low back pain.  I last saw her in March of last year, she was having numbness and tingling suggestive of a radicular etiology in the inferior cervical spine.  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  Jenifer's low back started to bother her a couple of months ago.  No clear event that she can recall,  Although she has been exercising more over the past couple of months.  Pain is constant, stabbing, worse with bending, squatting, and turning.  She has tried Advil and ice in addition to chiropractic treatment, these helped somewhat but her pain is persistent.  Additional history: as documented      REVIEW OF SYSTEMS (10/23/2018)  10 point ROS of systems including Constitutional, Eyes, Respiratory, Cardiovascular, Gastroenterology, Genitourinary, Integumentary, Musculoskeletal, Psychiatric were all negative except for pertinent positives noted in my HPI.     PHYSICAL EXAM  Vitals:    10/23/18 0748   BP: 133/85   Pulse: 65   SpO2: 100%   Weight: 71.6 kg (157 lb 14.4 oz)   Height: 1.63 m (5' 4.17\")     General  - normal appearance, in no obvious distress  CV  - normal peripheral perfusion  Pulm  - normal respiratory pattern, non-labored  Musculoskeletal - lumbar spine  - stance: normal gait without limp  - inspection: normal bone and joint alignment, no obvious scoliosis  - palpation: tender throughout lumbar paraspinals R > L  - ROM: flexion relieves pain  - strength: lower extremities 5/5 in all planes  - special tests:  (+) straight leg raise on R  (+) slump test on R  Neuro  - patellar and Achilles DTRs 2+ bilaterally, grossly normal coordination, normal muscle tone  Skin  - " no ecchymosis, erythema, warmth, or induration, no obvious rash  Psych  - interactive, appropriate, normal mood and affect          ASSESSMENT & PLAN  Ms. Berry is a 22 year old year old female here with low back pain.    I ordered & reviewed an xray of her low back which does show some mild narrowing of the L5-S1 junction.  This may account for her symptoms.  We discussed this in detail.    We revisited our discussion centering around treatment for radicular symptoms, she's had issues with her cervical spine in the past.    I do think she'll do great with conservative care and attention to exacerbating activities.  I gave her a home exercise plan and prescribed her naproxen and flexeril.    If her symptoms worsen over the next month despite these measures I'd order an MR.    It was a pleasure seeing Jenifer.        Vito Calderon DO, CAM            Again, thank you for allowing me to participate in the care of your patient.        Sincerely,        Vito Calderon DO

## 2018-10-23 NOTE — NURSING NOTE
"Jenifer Berry's chief complaint for this visit includes:  Chief Complaint   Patient presents with     Consult     lumbar back pain X 2months. pt states no fall or injury      PCP: Pooja Sheridan    Referring Provider:  No referring provider defined for this encounter.    /85  Pulse 65  Ht 1.63 m (5' 4.17\")  Wt 71.6 kg (157 lb 14.4 oz)  SpO2 100%  BMI 26.96 kg/m2  Severe Pain (6)     Do you need any medication refills at today's visit? No        "

## 2018-10-23 NOTE — MR AVS SNAPSHOT
After Visit Summary   10/23/2018    Jenifer Berry    MRN: [id number]           Patient Information     Date Of Birth          1995        Visit Information        Provider Department      10/23/2018 7:40 AM Vito Calderon, DO [facility]        Today's Diagnoses     Lumbar radicular pain    -  1      Care Instructions    Thanks for coming today.  Ortho[facility]Sports Medicine Clinic  [address] [place] [address]    To schedule future appointments in Ortho Clinic, you may call [phone].    To schedule ordered imaging by your provider:   Call Central Imaging [phone]    To schedule an injection ordered by your provider:  Call Central Imaging Injection scheduling line: [phone]  [employer] available online at:  [employer]    Please call if any further questions or concerns ([phone]).  Clinic hours 8 am to 5 pm.    Return to clinic (call) if symptoms worsen or fail to improve.            Follow-ups after your visit        Who to contact     If you have questions or need follow up information about today's clinic visit or your schedule please contact [facility] [[email or web address]] directly at [phone].  Normal or non-critical lab and imaging results will be communicated to you by [employer], letter or phone within 4 business days after the clinic has received the results. If you do not hear from us within 7 days, please contact the clinic through [employer] or phone. If you have a critical or abnormal lab result, we will notify you by phone as soon as possible.  Submit refill requests through [employer] or call your pharmacy and they will forward the refill request to us. Please allow 3 business days for your refill to be completed.          Additional Information About Your Visit        MyChart Information     [employer] gives you secure access to your electronic health record. If you see a primary care provider, you can also send messages to  "your care team and make appointments. If you have questions, please call your primary care clinic.  If you do not have a primary care provider, please call [phone] and they will assist you.      [employer] is an electronic gateway that provides easy, online access to your medical records. With [employer], you can request a clinic appointment, read your test results, renew a prescription or communicate with your care team.     To access your existing account, please contact your [facility] or call [phone] for assistance.        Care EveryWhere ID     This is your Care EveryWhere ID. This could be used by other organizations to access your [facility] medical records  [id number]        Your Vitals Were     Pulse Height Pulse Oximetry BMI (Body Mass Index)          65 1.63 m (5' 4.17\") 100% 26.96 kg/m2         Blood Pressure from Last 3 Encounters:   10/23/18 133/85   03/30/17 118/70   02/20/17 122/70    Weight from Last 3 Encounters:   10/23/18 71.6 kg (157 lb 14.4 oz)   11/23/15 64.3 kg (141 lb 12.1 oz)   04/09/15 63.9 kg (140 lb 14 oz) (71 %)*     * Growth percentiles are based on CDC 2-20 Years data.                 Today's Medication Changes          These changes are accurate as of 10/23/18  8:55 AM.  If you have any questions, ask your nurse or doctor.               Start taking these medicines.        Dose/Directions    cyclobenzaprine 10 MG tablet   Commonly known as:  FLEXERIL   Used for:  Lumbar radicular pain   Started by:  Vito Calderon,         Dose:  5 mg   Take 0.5 tablets (5 mg) by mouth nightly as needed for muscle spasms   Quantity:  30 tablet   Refills:  0         These medicines have changed or have updated prescriptions.        Dose/Directions    * naproxen 500 MG tablet   Commonly known as:  NAPROSYN   This may have changed:  Another medication with the same name was added. Make sure you understand how and when to take each.   Changed by:  Vito Calderon, " DO        Dose:  500 mg   Take 500 mg by mouth   Refills:  0       * naproxen 500 MG tablet   Commonly known as:  NAPROSYN   This may have changed:  You were already taking a medication with the same name, and this prescription was added. Make sure you understand how and when to take each.   Used for:  Lumbar radicular pain   Changed by:  Vito Calderon, DO        Dose:  500 mg   Take 1 tablet (500 mg) by mouth 2 times daily as needed for moderate pain   Quantity:  30 tablet   Refills:  1       * Notice:  This list has 2 medication(s) that are the same as other medications prescribed for you. Read the directions carefully, and ask your doctor or other care provider to review them with you.         Where to get your medicines      These medications were sent to [place] [address] [facility] [place] - [address]  [address] [place] [address]     Phone:  [phone]     cyclobenzaprine 10 MG tablet    naproxen 500 MG tablet                Primary Care Provider Office Phone # Fax #    Pooja MENDEZ Fatimah [phone]       [facility] [address] [place] [address]        Equal Access to Services     [facility]: Hadii aad ku hadasho Soomaali, waaxda luqadaha, qaybta kaalmada adeegyada, javed pepper haygen keen . So [facility] [phone].    ATENCIÓN: Si habla español, tiene a schumacher disposición servicios gratuitos de asistencia lingüística. Llame al [phone].    We comply with applicable federal civil rights laws and [place] laws. We do not discriminate on the basis of race, color, national origin, age, disability, sex, sexual orientation, or gender identity.            Thank you!     Thank you for choosing [facility]  for your care. Our goal is always to provide you with excellent care. Hearing back from our patients is one way we can continue to improve our services. Please take a few minutes to complete the written survey that you  may receive in the mail after your visit with us. Thank you!             Your Updated Medication List - Protect others around you: Learn how to safely use, store and throw away your medicines at www.disposemymeds.org.          This list is accurate as of 10/23/18  8:55 AM.  Always use your most recent med list.                   Brand Name Dispense Instructions for use Diagnosis    APRI 0.15-30 MG-MCG per tablet   Generic drug:  desogestrel-ethinyl estradiol      Take 1 tablet by mouth        cyclobenzaprine 10 MG tablet    FLEXERIL    30 tablet    Take 0.5 tablets (5 mg) by mouth nightly as needed for muscle spasms    Lumbar radicular pain       * naproxen 500 MG tablet    NAPROSYN     Take 500 mg by mouth        * naproxen 500 MG tablet    NAPROSYN    30 tablet    Take 1 tablet (500 mg) by mouth 2 times daily as needed for moderate pain    Lumbar radicular pain       * Notice:  This list has 2 medication(s) that are the same as other medications prescribed for you. Read the directions carefully, and ask your doctor or other care provider to review them with you.

## 2019-10-09 ENCOUNTER — OFFICE VISIT (OUTPATIENT)
Dept: ORTHOPEDICS | Facility: CLINIC | Age: 24
End: 2019-10-09
Payer: COMMERCIAL

## 2019-10-09 VITALS — BODY MASS INDEX: 31.18 KG/M2 | HEIGHT: 63 IN | WEIGHT: 176 LBS

## 2019-10-09 DIAGNOSIS — M54.50 LOW BACK PAIN POTENTIALLY ASSOCIATED WITH RADICULOPATHY: Primary | ICD-10-CM

## 2019-10-09 PROCEDURE — 99213 OFFICE O/P EST LOW 20 MIN: CPT | Performed by: FAMILY MEDICINE

## 2019-10-09 ASSESSMENT — MIFFLIN-ST. JEOR: SCORE: 1522.46

## 2019-10-09 ASSESSMENT — PAIN SCALES - GENERAL: PAINLEVEL: EXTREME PAIN (8)

## 2019-10-09 NOTE — LETTER
"    10/9/2019         RE: Jenifer Berry  [address] [facility] [address]        Dear Colleague,    Thank you for referring your patient, Jenifer Berry, to the [facility]. Please see a copy of my visit note below.    HISTORY OF PRESENT ILLNESS  Ms. Berry is a pleasant 23 year old year old female following up with pain in her low back.  I last saw Jenifer about a year ago for her low back, at that time she had increasing focal low back pain after exercising intensely for a couple of months.  Since last seeing me her pain has waxed and waned but was tolerable to a good degree.  Unfortunately her pain has come back as of the past month or so and has been quite severe.  She points to her central lumbosacral region, pain is directly in the middle, \"it feels like there is something in there.\"  Worse with backward bending, less so with forward bending.  She does have radicular symptoms at times, these are generally tolerable, this feels like a different pain to her.     PHYSICAL EXAM  Vitals:    10/09/19 0917   Weight: 79.8 kg (176 lb)   Height: 1.6 m (5' 3\")     General  - normal appearance, in no obvious distress  CV  - normal peripheral perfusion  Pulm  - normal respiratory pattern, non-labored  Musculoskeletal - lumbar spine  - stance: normal gait without limp  - inspection: normal bone and joint alignment, no obvious scoliosis  - palpation: tender spinous and transverse processes of L4-L5  - ROM: extension exacerbates pain, limited flexion, painful rotation R > L  - strength: lower extremities 5/5 in all planes  - special tests:  (-) slump test bilaterally  Neuro  - patellar and Achilles DTRs 2+ bilaterally, lower extremity sensory deficit throughout L5 distribution, grossly normal coordination, normal muscle tone  Skin  - no ecchymosis, erythema, warmth, or induration, no obvious rash  Psych  - interactive, appropriate, normal mood and affect          ASSESSMENT & PLAN  Ms. Berry is a 23 " "year old year old female following up with low back pain.    I reviewed her prior x-ray in the room with her which shows minimal disc space narrowing at L5-S1 but is otherwise normal.    Given her chronicity and level of disability I am ordering an MRI.  We should follow-up after her MRI.    It was a pleasure seeing Jenifer.        Vito Calderon DO, CAQSM      This note was constructed using Dragon dictation software, please excuse any minor errors in spelling, grammar, or syntax.            [facility] FOLLOW-UP VISIT 10/9/2019    Jenifer Berry's chief complaint for this visit includes:  Chief Complaint   Patient presents with     RECHECK     f/u low back pain, was doing better, increased pain in the past few months.      PCP: Pooja Sheridan    Referring Provider:  No referring provider defined for this encounter.    Ht 1.6 m (5' 3\")   Wt 79.8 kg (176 lb)   BMI 31.18 kg/m         Interval History:     Follow up reason: low back     Following Therapeutic Plan: Yes     Pain: Worsening    Function: Worsening      Medical History:    Any recent changes to your medical history? No    Any new medication prescribed since last visit? No    Review of Systems:    Do you have fever, chills, weight loss? No    Do you have any vision problems? No    Do you have any chest pain or edema? No    Do you have any shortness of breath or wheezing?  No    Do you have stomach problems? No    Do you have any numbness or focal weakness? No    Do you have diabetes? No    Do you have problems with bleeding or clotting? No    Do you have an rashes or other skin lesions? No      Again, thank you for allowing me to participate in the care of your patient.        Sincerely,        Vito Calderon DO    "

## 2019-10-09 NOTE — PROGRESS NOTES
"      [facility] FOLLOW-UP VISIT 10/9/2019    Jenifer Berry's chief complaint for this visit includes:  Chief Complaint   Patient presents with     RECHECK     f/u low back pain, was doing better, increased pain in the past few months.      PCP: Pooja Sheridan    Referring Provider:  No referring provider defined for this encounter.    Ht 1.6 m (5' 3\")   Wt 79.8 kg (176 lb)   BMI 31.18 kg/m        Interval History:     Follow up reason: low back     Following Therapeutic Plan: Yes     Pain: Worsening    Function: Worsening      Medical History:    Any recent changes to your medical history? No    Any new medication prescribed since last visit? No    Review of Systems:    Do you have fever, chills, weight loss? No    Do you have any vision problems? No    Do you have any chest pain or edema? No    Do you have any shortness of breath or wheezing?  No    Do you have stomach problems? No    Do you have any numbness or focal weakness? No    Do you have diabetes? No    Do you have problems with bleeding or clotting? No    Do you have an rashes or other skin lesions? No    "

## 2019-10-09 NOTE — PROGRESS NOTES
"HISTORY OF PRESENT ILLNESS  Ms. Berry is a pleasant 23 year old year old female following up with pain in her low back.  I last saw Jenifer about a year ago for her low back, at that time she had increasing focal low back pain after exercising intensely for a couple of months.  Since last seeing me her pain has waxed and waned but was tolerable to a good degree.  Unfortunately her pain has come back as of the past month or so and has been quite severe.  She points to her central lumbosacral region, pain is directly in the middle, \"it feels like there is something in there.\"  Worse with backward bending, less so with forward bending.  She does have radicular symptoms at times, these are generally tolerable, this feels like a different pain to her.     PHYSICAL EXAM  Vitals:    10/09/19 0917   Weight: 79.8 kg (176 lb)   Height: 1.6 m (5' 3\")     General  - normal appearance, in no obvious distress  CV  - normal peripheral perfusion  Pulm  - normal respiratory pattern, non-labored  Musculoskeletal - lumbar spine  - stance: normal gait without limp  - inspection: normal bone and joint alignment, no obvious scoliosis  - palpation: tender spinous and transverse processes of L4-L5  - ROM: extension exacerbates pain, limited flexion, painful rotation R > L  - strength: lower extremities 5/5 in all planes  - special tests:  (-) slump test bilaterally  Neuro  - patellar and Achilles DTRs 2+ bilaterally, lower extremity sensory deficit throughout L5 distribution, grossly normal coordination, normal muscle tone  Skin  - no ecchymosis, erythema, warmth, or induration, no obvious rash  Psych  - interactive, appropriate, normal mood and affect          ASSESSMENT & PLAN  Ms. Berry is a 23 year old year old female following up with low back pain.    I reviewed her prior x-ray in the room with her which shows minimal disc space narrowing at L5-S1 but is otherwise normal.    Given her chronicity and level of disability I am ordering " an MRI.  We should follow-up after her MRI.    It was a pleasure seeing Jenifer.        Vito Calderon DO, TAY      This note was constructed using Dragon dictation software, please excuse any minor errors in spelling, grammar, or syntax.

## 2019-10-12 ENCOUNTER — ANCILLARY PROCEDURE (OUTPATIENT)
Dept: MRI IMAGING | Facility: CLINIC | Age: 24
End: 2019-10-12
Attending: FAMILY MEDICINE
Payer: COMMERCIAL

## 2019-10-12 DIAGNOSIS — M54.50 LOW BACK PAIN POTENTIALLY ASSOCIATED WITH RADICULOPATHY: ICD-10-CM

## 2019-10-12 PROCEDURE — 72148 MRI LUMBAR SPINE W/O DYE: CPT | Mod: TC

## 2019-10-18 ENCOUNTER — TELEPHONE (OUTPATIENT)
Dept: ORTHOPEDICS | Facility: CLINIC | Age: 24
End: 2019-10-18

## 2019-10-18 DIAGNOSIS — M54.50 LOW BACK PAIN POTENTIALLY ASSOCIATED WITH RADICULOPATHY: Primary | ICD-10-CM

## 2019-10-18 DIAGNOSIS — M54.50 CHRONIC LOW BACK PAIN: ICD-10-CM

## 2019-10-18 DIAGNOSIS — M25.50 POLYARTHRALGIA: ICD-10-CM

## 2019-10-18 DIAGNOSIS — G89.29 CHRONIC LOW BACK PAIN: ICD-10-CM

## 2019-10-18 NOTE — TELEPHONE ENCOUNTER
10/18 called and spoke to patient. She is currently scheduled for all the appointments at this time.     Becky La   Procedure    Ortho/Sports Med/Ent/Eye   [facility]   [phone]

## 2019-10-18 NOTE — TELEPHONE ENCOUNTER
Per huddle with Dr. Calderon die to the patient having a normal MRI we would like to proceed with xrays of her SI joints  and labs.  The order have been placed today. The patient will be contacted to schedule imaging, labs and a follow up with Dr. Calderon.

## 2019-10-30 NOTE — TELEPHONE ENCOUNTER
The patient was contacted today to discuss her imaging appointments tomorrow.  A voicemail was left.     It was explained to the patient that her imaging order was incorrectly placed, she will only be having an xray tomorrow not an injection, she will not need a .

## 2019-10-31 ENCOUNTER — ANCILLARY PROCEDURE (OUTPATIENT)
Dept: GENERAL RADIOLOGY | Facility: CLINIC | Age: 24
End: 2019-10-31
Attending: FAMILY MEDICINE
Payer: COMMERCIAL

## 2019-10-31 ENCOUNTER — MYC MEDICAL ADVICE (OUTPATIENT)
Dept: ORTHOPEDICS | Facility: CLINIC | Age: 24
End: 2019-10-31

## 2019-10-31 DIAGNOSIS — M54.50 CHRONIC LOW BACK PAIN: ICD-10-CM

## 2019-10-31 DIAGNOSIS — M25.50 POLYARTHRALGIA: ICD-10-CM

## 2019-10-31 DIAGNOSIS — G89.29 CHRONIC LOW BACK PAIN: ICD-10-CM

## 2019-10-31 DIAGNOSIS — M54.50 LOW BACK PAIN POTENTIALLY ASSOCIATED WITH RADICULOPATHY: ICD-10-CM

## 2019-10-31 LAB
BASOPHILS # BLD AUTO: 0.1 10E9/L (ref 0–0.2)
BASOPHILS NFR BLD AUTO: 1.1 %
CRP SERPL-MCNC: 10.5 MG/L (ref 0–8)
DIFFERENTIAL METHOD BLD: NORMAL
EOSINOPHIL # BLD AUTO: 0.3 10E9/L (ref 0–0.7)
EOSINOPHIL NFR BLD AUTO: 3.2 %
ERYTHROCYTE [DISTWIDTH] IN BLOOD BY AUTOMATED COUNT: 11.7 % (ref 10–15)
ERYTHROCYTE [SEDIMENTATION RATE] IN BLOOD BY WESTERGREN METHOD: 3 MM/H (ref 0–20)
HCT VFR BLD AUTO: 40.9 % (ref 35–47)
HGB BLD-MCNC: 14.1 G/DL (ref 11.7–15.7)
IMM GRANULOCYTES # BLD: 0 10E9/L (ref 0–0.4)
IMM GRANULOCYTES NFR BLD: 0.4 %
LYMPHOCYTES # BLD AUTO: 2.7 10E9/L (ref 0.8–5.3)
LYMPHOCYTES NFR BLD AUTO: 33.5 %
MCH RBC QN AUTO: 30.5 PG (ref 26.5–33)
MCHC RBC AUTO-ENTMCNC: 34.5 G/DL (ref 31.5–36.5)
MCV RBC AUTO: 88 FL (ref 78–100)
MONOCYTES # BLD AUTO: 0.4 10E9/L (ref 0–1.3)
MONOCYTES NFR BLD AUTO: 5.3 %
NEUTROPHILS # BLD AUTO: 4.6 10E9/L (ref 1.6–8.3)
NEUTROPHILS NFR BLD AUTO: 56.5 %
PLATELET # BLD AUTO: 237 10E9/L (ref 150–450)
RBC # BLD AUTO: 4.63 10E12/L (ref 3.8–5.2)
WBC # BLD AUTO: 8.1 10E9/L (ref 4–11)

## 2019-10-31 PROCEDURE — 85025 COMPLETE CBC W/AUTO DIFF WBC: CPT | Performed by: FAMILY MEDICINE

## 2019-10-31 PROCEDURE — 86431 RHEUMATOID FACTOR QUANT: CPT | Performed by: FAMILY MEDICINE

## 2019-10-31 PROCEDURE — 36415 COLL VENOUS BLD VENIPUNCTURE: CPT | Performed by: FAMILY MEDICINE

## 2019-10-31 PROCEDURE — 85652 RBC SED RATE AUTOMATED: CPT | Performed by: FAMILY MEDICINE

## 2019-10-31 PROCEDURE — 86140 C-REACTIVE PROTEIN: CPT | Performed by: FAMILY MEDICINE

## 2019-10-31 PROCEDURE — 86038 ANTINUCLEAR ANTIBODIES: CPT | Performed by: FAMILY MEDICINE

## 2019-10-31 PROCEDURE — 72200 X-RAY EXAM SI JOINTS: CPT | Performed by: RADIOLOGY

## 2019-11-01 LAB
ANA SER QL IF: NEGATIVE
RHEUMATOID FACT SER NEPH-ACNC: <20 IU/ML (ref 0–20)

## 2019-11-04 ENCOUNTER — OFFICE VISIT (OUTPATIENT)
Dept: ORTHOPEDICS | Facility: CLINIC | Age: 24
End: 2019-11-04
Payer: COMMERCIAL

## 2019-11-04 VITALS — HEIGHT: 63 IN | BODY MASS INDEX: 31.18 KG/M2 | WEIGHT: 176 LBS

## 2019-11-04 DIAGNOSIS — M54.50 LOW BACK PAIN POTENTIALLY ASSOCIATED WITH RADICULOPATHY: Primary | ICD-10-CM

## 2019-11-04 PROCEDURE — 99213 OFFICE O/P EST LOW 20 MIN: CPT | Performed by: FAMILY MEDICINE

## 2019-11-04 ASSESSMENT — MIFFLIN-ST. JEOR: SCORE: 1522.46

## 2019-11-04 NOTE — LETTER
"    11/4/2019         RE: Jenifer Berry  [id number] [address]  [facility] [address]        Dear Colleague,    Thank you for referring your patient, Jenifer Berry, to the [facility]. Please see a copy of my visit note below.    HISTORY OF PRESENT ILLNESS  Ms. Berry is a pleasant 23 year old year old female following up with low back pain.  Jenifer is here to review her MRI and x-ray.     PHYSICAL EXAM  Vitals:    11/04/19 1122   Weight: 79.8 kg (176 lb)   Height: 1.6 m (5' 3\")       ASSESSMENT & PLAN  Ms. Berry is a 23 year old year old female following up with low back pain with some radicular features.    I reviewed her MR in the room with her which shows minimal disc height loss at L5-S1 with no significant stenosis..  I also reviewed her SI x-rays in the room with her which are normal.    Her symptoms are consistent with either musculoligamentous low back pain or a potential stenosis issue, I did explain to her that MRIs are not perfect, although can be very helpful.  Her pain is suggestive of a discogenic issue, we did discuss the utility of a diagnostic and therapeutic injection.  She is going to get this done at her earliest convenience.    If this injection does not help her at all I would likely order hip imaging and possibly refer her to our partners in rheumatology.    It was a pleasure seeing Jenifer.        Vito Calderon DO, TAY      This note was constructed using Dragon dictation software, please excuse any minor errors in spelling, grammar, or syntax.        [facility] FOLLOW-UP VISIT 11/4/2019    Jenifer Berry's chief complaint for this visit includes:  Chief Complaint   Patient presents with     RECHECK     f/u lumbar MRi, labs and SI joint images.      PCP: Pooja Sheridan    Referring Provider:  No referring provider defined for this encounter.    Ht 1.6 m (5' 3\")   Wt 79.8 kg (176 lb)   BMI 31.18 kg/m     Data Unavailable       Interval History: "     Follow up reason: imaging  And labs       Medical History:    Any recent changes to your medical history? No    Any new medication prescribed since last visit? No    Review of Systems:    Do you have fever, chills, weight loss? No    Do you have any vision problems? No    Do you have any chest pain or edema? No    Do you have any shortness of breath or wheezing?  No    Do you have stomach problems? No    Do you have any numbness or focal weakness? No    Do you have diabetes? No    Do you have problems with bleeding or clotting? No    Do you have an rashes or other skin lesions? No      Again, thank you for allowing me to participate in the care of your patient.        Sincerely,        Vito Calderon, DO

## 2019-11-04 NOTE — PROGRESS NOTES
"HISTORY OF PRESENT ILLNESS  Ms. Berry is a pleasant 23 year old year old female following up with low back pain.  Jenifer is here to review her MRI and x-ray.     PHYSICAL EXAM  Vitals:    11/04/19 1122   Weight: 79.8 kg (176 lb)   Height: 1.6 m (5' 3\")       ASSESSMENT & PLAN  Ms. Berry is a 23 year old year old female following up with low back pain with some radicular features.    I reviewed her MR in the room with her which shows minimal disc height loss at L5-S1 with no significant stenosis..  I also reviewed her SI x-rays in the room with her which are normal.    Her symptoms are consistent with either musculoligamentous low back pain or a potential stenosis issue, I did explain to her that MRIs are not perfect, although can be very helpful.  Her pain is suggestive of a discogenic issue, we did discuss the utility of a diagnostic and therapeutic injection.  She is going to get this done at her earliest convenience.    If this injection does not help her at all I would likely order hip imaging and possibly refer her to our partners in rheumatology.    It was a pleasure seeing Jenifer.        Vito Calderon DO, TAY      This note was constructed using Dragon dictation software, please excuse any minor errors in spelling, grammar, or syntax.        [facility] FOLLOW-UP VISIT 11/4/2019    Jenifer Berry's chief complaint for this visit includes:  Chief Complaint   Patient presents with     RECHECK     f/u lumbar MRi, labs and SI joint images.      PCP: Pooja Sheridan    Referring Provider:  No referring provider defined for this encounter.    Ht 1.6 m (5' 3\")   Wt 79.8 kg (176 lb)   BMI 31.18 kg/m    Data Unavailable       Interval History:     Follow up reason: imaging  And labs       Medical History:    Any recent changes to your medical history? No    Any new medication prescribed since last visit? No    Review of Systems:    Do you have fever, chills, weight loss? No    Do you have any " vision problems? No    Do you have any chest pain or edema? No    Do you have any shortness of breath or wheezing?  No    Do you have stomach problems? No    Do you have any numbness or focal weakness? No    Do you have diabetes? No    Do you have problems with bleeding or clotting? No    Do you have an rashes or other skin lesions? No

## 2019-11-07 ENCOUNTER — ANCILLARY PROCEDURE (OUTPATIENT)
Dept: GENERAL RADIOLOGY | Facility: CLINIC | Age: 24
End: 2019-11-07
Attending: FAMILY MEDICINE
Payer: COMMERCIAL

## 2019-11-07 VITALS — HEART RATE: 67 BPM | OXYGEN SATURATION: 100 % | SYSTOLIC BLOOD PRESSURE: 132 MMHG | DIASTOLIC BLOOD PRESSURE: 80 MMHG

## 2019-11-07 DIAGNOSIS — M54.50 LOW BACK PAIN POTENTIALLY ASSOCIATED WITH RADICULOPATHY: ICD-10-CM

## 2019-11-07 PROCEDURE — 62323 NJX INTERLAMINAR LMBR/SAC: CPT | Performed by: RADIOLOGY

## 2019-11-07 RX ORDER — BUPIVACAINE HYDROCHLORIDE 5 MG/ML
2 INJECTION, SOLUTION EPIDURAL; INTRACAUDAL ONCE
Status: COMPLETED | OUTPATIENT
Start: 2019-11-07 | End: 2019-11-07

## 2019-11-07 RX ORDER — METHYLPREDNISOLONE ACETATE 80 MG/ML
80 INJECTION, SUSPENSION INTRA-ARTICULAR; INTRALESIONAL; INTRAMUSCULAR; SOFT TISSUE ONCE
Status: COMPLETED | OUTPATIENT
Start: 2019-11-07 | End: 2019-11-07

## 2019-11-07 RX ORDER — IOPAMIDOL 408 MG/ML
10 INJECTION, SOLUTION INTRATHECAL ONCE
Status: COMPLETED | OUTPATIENT
Start: 2019-11-07 | End: 2019-11-07

## 2019-11-07 RX ADMIN — BUPIVACAINE HYDROCHLORIDE 20 MG: 5 INJECTION, SOLUTION EPIDURAL; INTRACAUDAL at 14:11

## 2019-11-07 RX ADMIN — IOPAMIDOL 2 ML: 408 INJECTION, SOLUTION INTRATHECAL at 14:12

## 2019-11-07 RX ADMIN — METHYLPREDNISOLONE ACETATE 80 MG: 80 INJECTION, SUSPENSION INTRA-ARTICULAR; INTRALESIONAL; INTRAMUSCULAR; SOFT TISSUE at 14:12

## 2019-11-07 NOTE — PROGRESS NOTES
: Jenifer was seen in X-ray today for a lumbar epidural injection. Patient rated pain before procedure 8/10. After procedure patient rated pain 4/10. This pain level is acceptable to patient. Patient discharged home with .

## 2019-11-07 NOTE — PROGRESS NOTES
AFTER YOU GO HOME    ? DO relax; minimize your activity for 24 hours  ? You may resume normal activity tomorrow  ? You may remove the bandage in the evening or next morning  ? You may resume bathing the next day  ? Drink at least 4 extra glasses of fluid today if not on fluid restrictions  ? DO NOT drive or operate machinery at home or at work for at least 24 hours      VISIT THE EMERGENCY ROOM OR URGENT CARE IF:    ? There is redness or swelling at the injection site  ? There is discharge from the injection site  ? You develop a temperature of 101  F or greater      ADDITIONAL INSTRUCTIONS:    ? You may resume your Coumadin or other blood thinner at your regular dose today.  Follow up with your physician to have your INR rechecked if indicated.  ? If you gain no relief from the injection after two (2) weeks, follow-up with your provider for your options.        Contacts:    During business hours from 8 to 5 pm, you may call [phone] to reach a nurse advisor at [facility].  After hours, call [facility]  [phone].  Ask for the Radiologist on-call.  Someone is on-call 24 hrs/day.  [facility] Toll Free Number   [phone]

## 2019-11-24 ENCOUNTER — MYC MEDICAL ADVICE (OUTPATIENT)
Dept: ORTHOPEDICS | Facility: CLINIC | Age: 24
End: 2019-11-24

## 2019-11-24 DIAGNOSIS — M25.50 POLYARTHRALGIA: Primary | ICD-10-CM

## 2019-11-25 NOTE — TELEPHONE ENCOUNTER
Per huddle with Dr. Calderon the patient should see Rheumatology, a referral was placed today. The patient was contacted via [employer] to let her know about the referral.

## 2020-03-01 ENCOUNTER — HEALTH MAINTENANCE LETTER (OUTPATIENT)
Age: 25
End: 2020-03-01

## 2020-11-27 ENCOUNTER — VIRTUAL VISIT (OUTPATIENT)
Dept: FAMILY MEDICINE | Facility: OTHER | Age: 25
End: 2020-11-27

## 2020-11-27 NOTE — PROGRESS NOTES
"Date: 2020 03:29:34  Clinician: Pooja Goldman  Clinician NPI: [id number]  Patient: Jenifer Berry  Patient : 1995  Patient Address: [address] [place] [address] [place] [address]  Patient Phone: [phone]  Visit Protocol: URI  Patient Summary:  Jenifer is a 25 year old ( : 1995 ) female who initiated a [facility] Visit for COVID-19 (Coronavirus) evaluation and screening. When asked the question \"Please sign me up to receive news, health information and promotions. \", Jenifer responded \"Yes\".    When asked when her symptoms started, Jenifer reported that she does not have any symptoms.   She denies taking antibiotic medication in the past month and having recent facial or sinus surgery in the past 60 days.    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 In the past 14 days, Jenifer has worked or volunteered at a hospital or a clinic. Additional job details as reported by the patient (free text): Nursing Assistant working in float pool at [employer]   In the past 14 days, she has not lived in a congregate living setting.   Jenifer has had a close contact with a laboratory-confirmed COVID-19 patient in the last 14 days. She was not exposed at her work. Date Jenifer was exposed to the laboratory-confirmed COVID-19 patient: 2020   Additional information about contact with COVID-19 (Coronavirus) patient as reported by the patient (free text): Exposed at and by sister-in-law who did not know at the time she was positive. She knew she as exposed   Jenifer is not living in the same household with the COVID-19 positive patient. She was in an enclosed space for greater than 15 minutes with the COVID-19 patient.   During the encounter, neither were wearing masks.   Since 2019, Jenifer has not been tested for COVID-19 and has had upper respiratory infection (URI) or influenza-like illness.      " Date(s) of previous URI or influenza-like illness (free-text): 12/6/2019-12/19/2019     Symptoms Jenifer experienced during previous URI or influenza-like illness as reported by the patient (free-text): Sinus pressure, severe cough, chest pain, fever        Pertinent medical history  Jenifer typically gets a yeast infection when she takes antibiotics. She has used fluconazole (Diflucan) to treat previous yeast infections. 1 dose of fluconazole (Diflucan) has typically been sufficient for symptoms to resolve in the past.   She has not been told by her provider to avoid NSAIDs.   Jenifer does not have diabetes. She denies having immunosuppressive conditions (e.g., chemotherapy, HIV, organ transplant, long-term use of steroids or other immunosuppressive medications, splenectomy). She does not have severe COPD and congestive heart failure. She does not have asthma.   Jenifer needs a return to work/school note.   Weight: 160 lbs   Jenifer does not smoke or use smokeless tobacco.   She denies pregnancy and denies breastfeeding. She has menstruated in the past month.   Weight: 160 lbs    MEDICATIONS: Zyrtec oral, Enskyce oral, Adderall XR oral, Linzess oral, ALLERGIES: NKDA  Clinician Response:  Dear Jenifer,   Based on your exposure to COVID-19 (coronavirus), we would like to test you for this virus.  1. Please call [phone] to schedule your visit. Explain that you were [facility] have a [facility]-19 test. Be ready to share your [facility] visit ID number.  * If you need to schedule in [facility] please call [phone] or for [employer] employees please call [phone].   * If you need to schedule in the [place] area please call [phone]. [employer] employees call [phone].   The following will serve as your written order for this COVID Test, ordered by me, for the indication of suspected COVID [Z20.828]: The test will be ordered in [facility], our electronic health record, after you are scheduled. It will show as  ordered and authorized by Milad Lugo MD.  Order: COVID-19 (coronavirus) PCR for ASYMPTOMATIC EXPOSURE testing from [facility].   If you know you have had close contact with someone who tested positive, you should be quarantined for 14 days after this exposure. You should stay in quarantine for the14 days even if the covid test is negative, the optimal time to test after exposure is 5-7 days from the exposure  Quarantine means   What should I do?  For safety, it's very important to follow these rules. Do this for 14 days after the date you were last exposed to the virus..  Stay home and away from others. Don't go to school or anywhere else. Generally quarantine means staying home from work but there are some exceptions to this. Please contact your workplace.   No hugging, kissing or shaking hands.  Don't let anyone visit.  Cover your mouth and nose with a mask, tissue or washcloth to avoid spreading germs.  Wash your hands and face often. Use soap and water.  What are the symptoms of COVID-19?  The most common symptoms are cough, fever and trouble breathing. Less common symptoms include headache, body aches, fatigue (feeling very tired), chills, sore throat, stuffy or runny nose, diarrhea (loose poop), loss of taste or smell, belly pain, and nausea or vomiting (feeling sick to your stomach or throwing up).  After 14 days, if you have still don't have symptoms, you likely don't have this virus.  If you develop symptoms, follow these guidelines.  If you're normally healthy: Please start another [facility] visit to report your symptoms. Go to [email or web address].  If you have a serious health problem (like cancer, heart failure, an organ transplant or kidney disease): Call your specialty clinic. Let them know that you might have COVID-19.  2. When it's time for your COVID test:  Stay at least 6 feet away from others. (If someone will drive you to your test, stay in the backseat, as far away from the  as you can.)  Cover your  mouth and nose with a mask, tissue or washcloth.  Go straight to the testing site. Don't make any stops on the way there or back.  Please note  Caregivers in these groups are at risk for severe illness due to COVID-19:  o People 65 years and older  o People who live in a nursing home or long-term care facility  o People with chronic disease (lung, heart, cancer, diabetes, kidney, liver, immunologic)  o People who have a weakened immune system, including those who:  Are in cancer treatment  Take medicine that weakens the immune system, such as corticosteroids  Had a bone marrow or organ transplant  Have an immune deficiency  Have poorly controlled HIV or AIDS  Are obese (body mass index of 40 or higher)  Smoke regularly  Where can I get more information?   [facility] -- About COVID-19: [email or web address]  CDC -- What to Do If You're Sick: www.cdc.gov/coronavirus/2019-ncov/about/steps-when-sick.html  [facility] -- Ending Home Isolation: www.cdc.gov/coronavirus/2019-ncov/hcp/disposition-in-home-patients.html  [facility] -- Caring for Someone: www.cdc.gov/coronavirus/2019-ncov/if-you-are-sick/care-for-someone.html  [facility] -- Interim Guidance for Hospital Discharge to Home: www.health.[facility].[place]/diseases/coronavirus/hcp/hospdischarge.pdf  [facility] clinical trials (COVID-19 research studies): clinicalaffairs.[facility]-clinical-trials  Below are the COVID-19 hotlines at the [facility] ([facility]). Interpreters are available.  For health questions: Call 092-476-3919 or 1-603.754.3216 (7 a.m. to 7 p.m.)  For questions about schools and childcare: Call 631-426-7506 or 1-722.386.1866 (7 a.m. to 7 p.m.)    Diagnosis: Contact with and (suspected) exposure to other viral communicable diseases  Diagnosis ICD: Z20.828

## 2021-04-18 ENCOUNTER — HEALTH MAINTENANCE LETTER (OUTPATIENT)
Age: 26
End: 2021-04-18

## 2021-10-02 ENCOUNTER — HEALTH MAINTENANCE LETTER (OUTPATIENT)
Age: 26
End: 2021-10-02

## 2022-01-17 NOTE — PATIENT INSTRUCTIONS
Thanks for coming today.  [facility]  [address] [place] [address]    To schedule future appointments in Ortho Clinic, you may call [phone].    To schedule ordered imaging by your provider:   Call [facility] [phone]    To schedule an injection ordered by your provider:  Call [facility] Injection scheduling line: [phone]  Instabughart available online at:  [email or web address]    Please call if any further questions or concerns ([phone]).  Clinic hours 8 am to 5 pm.    Return to clinic (call) if symptoms worsen or fail to improve.    
DISCHARGE

## 2022-05-14 ENCOUNTER — HEALTH MAINTENANCE LETTER (OUTPATIENT)
Age: 27
End: 2022-05-14

## 2022-09-03 ENCOUNTER — HEALTH MAINTENANCE LETTER (OUTPATIENT)
Age: 27
End: 2022-09-03

## 2023-06-03 ENCOUNTER — HEALTH MAINTENANCE LETTER (OUTPATIENT)
Age: 28
End: 2023-06-03